# Patient Record
Sex: MALE | Race: BLACK OR AFRICAN AMERICAN | NOT HISPANIC OR LATINO | URBAN - METROPOLITAN AREA
[De-identification: names, ages, dates, MRNs, and addresses within clinical notes are randomized per-mention and may not be internally consistent; named-entity substitution may affect disease eponyms.]

---

## 2018-07-15 ENCOUNTER — INPATIENT (INPATIENT)
Facility: HOSPITAL | Age: 63
LOS: 0 days | Discharge: HOME | End: 2018-07-16
Attending: SURGERY | Admitting: SURGERY
Payer: COMMERCIAL

## 2018-07-15 VITALS
TEMPERATURE: 99 F | SYSTOLIC BLOOD PRESSURE: 174 MMHG | RESPIRATION RATE: 20 BRPM | DIASTOLIC BLOOD PRESSURE: 94 MMHG | OXYGEN SATURATION: 97 % | HEART RATE: 74 BPM

## 2018-07-15 LAB
ALBUMIN SERPL ELPH-MCNC: 4.9 G/DL — SIGNIFICANT CHANGE UP (ref 3.5–5.2)
ALP SERPL-CCNC: 108 U/L — SIGNIFICANT CHANGE UP (ref 30–115)
ALT FLD-CCNC: 14 U/L — SIGNIFICANT CHANGE UP (ref 0–41)
ANION GAP SERPL CALC-SCNC: 14 MMOL/L — SIGNIFICANT CHANGE UP (ref 7–14)
AST SERPL-CCNC: 15 U/L — SIGNIFICANT CHANGE UP (ref 0–41)
BASOPHILS # BLD AUTO: 0.02 K/UL — SIGNIFICANT CHANGE UP (ref 0–0.2)
BASOPHILS NFR BLD AUTO: 0.2 % — SIGNIFICANT CHANGE UP (ref 0–1)
BILIRUB DIRECT SERPL-MCNC: 0.2 MG/DL — SIGNIFICANT CHANGE UP (ref 0–0.2)
BILIRUB INDIRECT FLD-MCNC: 0.5 MG/DL — SIGNIFICANT CHANGE UP (ref 0.2–1.2)
BILIRUB SERPL-MCNC: 0.7 MG/DL — SIGNIFICANT CHANGE UP (ref 0.2–1.2)
BUN SERPL-MCNC: 9 MG/DL — LOW (ref 10–20)
CALCIUM SERPL-MCNC: 9.8 MG/DL — SIGNIFICANT CHANGE UP (ref 8.5–10.1)
CHLORIDE SERPL-SCNC: 101 MMOL/L — SIGNIFICANT CHANGE UP (ref 98–110)
CO2 SERPL-SCNC: 28 MMOL/L — SIGNIFICANT CHANGE UP (ref 17–32)
CREAT SERPL-MCNC: 1.1 MG/DL — SIGNIFICANT CHANGE UP (ref 0.7–1.5)
EOSINOPHIL # BLD AUTO: 0 K/UL — SIGNIFICANT CHANGE UP (ref 0–0.7)
EOSINOPHIL NFR BLD AUTO: 0 % — SIGNIFICANT CHANGE UP (ref 0–8)
GLUCOSE SERPL-MCNC: 99 MG/DL — SIGNIFICANT CHANGE UP (ref 70–99)
HCT VFR BLD CALC: 44 % — SIGNIFICANT CHANGE UP (ref 42–52)
HGB BLD-MCNC: 15.1 G/DL — SIGNIFICANT CHANGE UP (ref 14–18)
IMM GRANULOCYTES NFR BLD AUTO: 0.4 % — HIGH (ref 0.1–0.3)
LACTATE SERPL-SCNC: 1.5 MMOL/L — SIGNIFICANT CHANGE UP (ref 0.5–2.2)
LIDOCAIN IGE QN: 22 U/L — SIGNIFICANT CHANGE UP (ref 7–60)
LYMPHOCYTES # BLD AUTO: 1.46 K/UL — SIGNIFICANT CHANGE UP (ref 1.2–3.4)
LYMPHOCYTES # BLD AUTO: 11.1 % — LOW (ref 20.5–51.1)
MCHC RBC-ENTMCNC: 27.1 PG — SIGNIFICANT CHANGE UP (ref 27–31)
MCHC RBC-ENTMCNC: 34.3 G/DL — SIGNIFICANT CHANGE UP (ref 32–37)
MCV RBC AUTO: 78.9 FL — LOW (ref 80–94)
MONOCYTES # BLD AUTO: 0.83 K/UL — HIGH (ref 0.1–0.6)
MONOCYTES NFR BLD AUTO: 6.3 % — SIGNIFICANT CHANGE UP (ref 1.7–9.3)
NEUTROPHILS # BLD AUTO: 10.83 K/UL — HIGH (ref 1.4–6.5)
NEUTROPHILS NFR BLD AUTO: 82 % — HIGH (ref 42.2–75.2)
NRBC # BLD: 0 /100 WBCS — SIGNIFICANT CHANGE UP (ref 0–0)
PLATELET # BLD AUTO: 235 K/UL — SIGNIFICANT CHANGE UP (ref 130–400)
POTASSIUM SERPL-MCNC: 3.9 MMOL/L — SIGNIFICANT CHANGE UP (ref 3.5–5)
POTASSIUM SERPL-SCNC: 3.9 MMOL/L — SIGNIFICANT CHANGE UP (ref 3.5–5)
PROT SERPL-MCNC: 7.3 G/DL — SIGNIFICANT CHANGE UP (ref 6–8)
RBC # BLD: 5.58 M/UL — SIGNIFICANT CHANGE UP (ref 4.7–6.1)
RBC # FLD: 12.8 % — SIGNIFICANT CHANGE UP (ref 11.5–14.5)
SODIUM SERPL-SCNC: 143 MMOL/L — SIGNIFICANT CHANGE UP (ref 135–146)
WBC # BLD: 13.19 K/UL — HIGH (ref 4.8–10.8)
WBC # FLD AUTO: 13.19 K/UL — HIGH (ref 4.8–10.8)

## 2018-07-15 RX ORDER — ONDANSETRON 8 MG/1
4 TABLET, FILM COATED ORAL ONCE
Qty: 0 | Refills: 0 | Status: COMPLETED | OUTPATIENT
Start: 2018-07-15 | End: 2018-07-15

## 2018-07-15 RX ORDER — FAMOTIDINE 10 MG/ML
20 INJECTION INTRAVENOUS ONCE
Qty: 0 | Refills: 0 | Status: COMPLETED | OUTPATIENT
Start: 2018-07-15 | End: 2018-07-15

## 2018-07-15 RX ORDER — SODIUM CHLORIDE 9 MG/ML
1000 INJECTION, SOLUTION INTRAVENOUS
Qty: 0 | Refills: 0 | Status: DISCONTINUED | OUTPATIENT
Start: 2018-07-15 | End: 2018-07-16

## 2018-07-15 RX ADMIN — SODIUM CHLORIDE 100 MILLILITER(S): 9 INJECTION, SOLUTION INTRAVENOUS at 20:15

## 2018-07-15 RX ADMIN — FAMOTIDINE 20 MILLIGRAM(S): 10 INJECTION INTRAVENOUS at 20:14

## 2018-07-15 RX ADMIN — ONDANSETRON 4 MILLIGRAM(S): 8 TABLET, FILM COATED ORAL at 20:14

## 2018-07-15 NOTE — ED PROVIDER NOTE - NS ED ROS FT
General: +fevers, nausea, vomiting  Eyes:  No visual changes, eye pain  ENMT:  No hearing changes, pain,   Cardiac:  No chest pain, SOB or edema.   Respiratory:  No cough or respiratory distress.   GI:  +nausea, +vomiting, No diarrhea, +abdominal pain.  :  No dysuria, frequency or burning.  MS:  No muscle weakness, joint pain or back pain.  Neuro:  No  weakness.  No LOC.  Skin:  No skin rash.   Endocrine: No history of thyroid disease or diabetes.

## 2018-07-15 NOTE — ED PROVIDER NOTE - OBJECTIVE STATEMENT
61yo m no sig pmhx presents CC r sided abdominal cramping since approx 7am this morning, after eating burger inge last night which he does not usually eat. pt states that between 7am and 1pm, had approx 10 episodes nbnb emesis provoked by this r sided abdominal pain described as a cramping pain, which is now epigastric in location. pt reports tactile fever. no sob, chest pain, weakness, pain w urination, sick contacts. denies smoking hx.

## 2018-07-15 NOTE — ED PROCEDURE NOTE - ATTENDING CONTRIBUTION TO CARE
I was present for and supervised the key/critical aspects of the procedures performed during the care of the patient. IV access

## 2018-07-15 NOTE — ED PROVIDER NOTE - PHYSICAL EXAMINATION
CONSTITUTIONAL: Well-developed; well-nourished; in no acute distress.   SKIN: warm, dry  HEAD: Normocephalic; atraumatic.  EYES: PERRL, EOMI, no conjunctival erythema  ENT: No nasal discharge; airway clear, mucous membranes moist  NECK: Supple; non tender.  CARD: +S1, S2 no murmurs, gallops, or rubs. Regular rate and rhythm.   RESP: No wheezes, rales or rhonchi. CTABL  ABD: +ruq tenderness, no rebound, no guarding, no rigidity, +murphys  BACK: no cva tenderness   EXT: moves all extremities, ambulates wo assistance No clubbing, cyanosis or edema.   NEURO: Alert, oriented, grossly unremarkable  PSYCH: Cooperative, appropriate.

## 2018-07-15 NOTE — ED PROVIDER NOTE - MEDICAL DECISION MAKING DETAILS
62m w RUQ pain/tender. Labs & imaging reviewed. Surgery consulted for cholecystitis & abx given. Patient admitted for further care and management.

## 2018-07-15 NOTE — ED PROVIDER NOTE - PROGRESS NOTE DETAILS
bedside US shows gb stones, offical us pending, labs, meds, ekg ordered results reviewed, pt reevalauted, will consult surgery results reviewed, pt reevalauted, will consult surgery, spoke to dr prakash will come evaluate the pt case d/w Surgery and requesting abx and pre-op labs for admit to Dr Arroyo and operative intervention tonight. patient NPO

## 2018-07-16 ENCOUNTER — RESULT REVIEW (OUTPATIENT)
Age: 63
End: 2018-07-16

## 2018-07-16 ENCOUNTER — TRANSCRIPTION ENCOUNTER (OUTPATIENT)
Age: 63
End: 2018-07-16

## 2018-07-16 VITALS
TEMPERATURE: 97 F | HEART RATE: 70 BPM | DIASTOLIC BLOOD PRESSURE: 60 MMHG | OXYGEN SATURATION: 97 % | SYSTOLIC BLOOD PRESSURE: 127 MMHG | RESPIRATION RATE: 20 BRPM

## 2018-07-16 LAB
APTT BLD: 30.8 SEC — SIGNIFICANT CHANGE UP (ref 27–39.2)
BLD GP AB SCN SERPL QL: SIGNIFICANT CHANGE UP
INR BLD: 1.21 RATIO — SIGNIFICANT CHANGE UP (ref 0.65–1.3)
PROTHROM AB SERPL-ACNC: 13 SEC — HIGH (ref 9.95–12.87)
TYPE + AB SCN PNL BLD: SIGNIFICANT CHANGE UP

## 2018-07-16 PROCEDURE — 47562 LAPAROSCOPIC CHOLECYSTECTOMY: CPT

## 2018-07-16 PROCEDURE — 99222 1ST HOSP IP/OBS MODERATE 55: CPT | Mod: 57

## 2018-07-16 RX ORDER — ONDANSETRON 8 MG/1
4 TABLET, FILM COATED ORAL EVERY 6 HOURS
Qty: 0 | Refills: 0 | Status: DISCONTINUED | OUTPATIENT
Start: 2018-07-16 | End: 2018-07-16

## 2018-07-16 RX ORDER — ONDANSETRON 8 MG/1
4 TABLET, FILM COATED ORAL ONCE
Qty: 0 | Refills: 0 | Status: DISCONTINUED | OUTPATIENT
Start: 2018-07-16 | End: 2018-07-16

## 2018-07-16 RX ORDER — CIPROFLOXACIN LACTATE 400MG/40ML
400 VIAL (ML) INTRAVENOUS ONCE
Qty: 0 | Refills: 0 | Status: DISCONTINUED | OUTPATIENT
Start: 2018-07-16 | End: 2018-07-16

## 2018-07-16 RX ORDER — HEPARIN SODIUM 5000 [USP'U]/ML
5000 INJECTION INTRAVENOUS; SUBCUTANEOUS EVERY 8 HOURS
Qty: 0 | Refills: 0 | Status: DISCONTINUED | OUTPATIENT
Start: 2018-07-16 | End: 2018-07-16

## 2018-07-16 RX ORDER — OXYCODONE AND ACETAMINOPHEN 5; 325 MG/1; MG/1
1 TABLET ORAL EVERY 4 HOURS
Qty: 0 | Refills: 0 | Status: DISCONTINUED | OUTPATIENT
Start: 2018-07-16 | End: 2018-07-16

## 2018-07-16 RX ORDER — MORPHINE SULFATE 50 MG/1
4 CAPSULE, EXTENDED RELEASE ORAL
Qty: 0 | Refills: 0 | Status: DISCONTINUED | OUTPATIENT
Start: 2018-07-16 | End: 2018-07-16

## 2018-07-16 RX ORDER — SODIUM CHLORIDE 9 MG/ML
1000 INJECTION INTRAMUSCULAR; INTRAVENOUS; SUBCUTANEOUS
Qty: 0 | Refills: 0 | Status: DISCONTINUED | OUTPATIENT
Start: 2018-07-16 | End: 2018-07-16

## 2018-07-16 RX ORDER — METRONIDAZOLE 500 MG
500 TABLET ORAL ONCE
Qty: 0 | Refills: 0 | Status: COMPLETED | OUTPATIENT
Start: 2018-07-16 | End: 2018-07-16

## 2018-07-16 RX ORDER — OXYCODONE HYDROCHLORIDE 5 MG/1
1 TABLET ORAL
Qty: 5 | Refills: 0 | OUTPATIENT
Start: 2018-07-16

## 2018-07-16 RX ORDER — ONDANSETRON 8 MG/1
4 TABLET, FILM COATED ORAL EVERY 8 HOURS
Qty: 0 | Refills: 0 | Status: DISCONTINUED | OUTPATIENT
Start: 2018-07-16 | End: 2018-07-16

## 2018-07-16 RX ORDER — SODIUM CHLORIDE 9 MG/ML
1000 INJECTION, SOLUTION INTRAVENOUS
Qty: 0 | Refills: 0 | Status: DISCONTINUED | OUTPATIENT
Start: 2018-07-16 | End: 2018-07-16

## 2018-07-16 RX ORDER — CIPROFLOXACIN LACTATE 400MG/40ML
VIAL (ML) INTRAVENOUS
Qty: 0 | Refills: 0 | Status: DISCONTINUED | OUTPATIENT
Start: 2018-07-16 | End: 2018-07-16

## 2018-07-16 RX ORDER — CIPROFLOXACIN LACTATE 400MG/40ML
400 VIAL (ML) INTRAVENOUS ONCE
Qty: 0 | Refills: 0 | Status: COMPLETED | OUTPATIENT
Start: 2018-07-16 | End: 2018-07-16

## 2018-07-16 RX ORDER — ACETAMINOPHEN 500 MG
1 TABLET ORAL
Qty: 9 | Refills: 0 | OUTPATIENT
Start: 2018-07-16 | End: 2018-07-18

## 2018-07-16 RX ORDER — IBUPROFEN 200 MG
400 TABLET ORAL EVERY 6 HOURS
Qty: 0 | Refills: 0 | Status: DISCONTINUED | OUTPATIENT
Start: 2018-07-16 | End: 2018-07-16

## 2018-07-16 RX ORDER — PANTOPRAZOLE SODIUM 20 MG/1
40 TABLET, DELAYED RELEASE ORAL
Qty: 0 | Refills: 0 | Status: DISCONTINUED | OUTPATIENT
Start: 2018-07-16 | End: 2018-07-16

## 2018-07-16 RX ORDER — ACETAMINOPHEN 500 MG
650 TABLET ORAL EVERY 4 HOURS
Qty: 0 | Refills: 0 | Status: DISCONTINUED | OUTPATIENT
Start: 2018-07-16 | End: 2018-07-16

## 2018-07-16 RX ORDER — FAMOTIDINE 10 MG/ML
20 INJECTION INTRAVENOUS EVERY 12 HOURS
Qty: 0 | Refills: 0 | Status: DISCONTINUED | OUTPATIENT
Start: 2018-07-16 | End: 2018-07-16

## 2018-07-16 RX ORDER — ACETAMINOPHEN 500 MG
650 TABLET ORAL ONCE
Qty: 0 | Refills: 0 | Status: DISCONTINUED | OUTPATIENT
Start: 2018-07-16 | End: 2018-07-16

## 2018-07-16 RX ORDER — CIPROFLOXACIN LACTATE 400MG/40ML
400 VIAL (ML) INTRAVENOUS EVERY 12 HOURS
Qty: 0 | Refills: 0 | Status: DISCONTINUED | OUTPATIENT
Start: 2018-07-16 | End: 2018-07-16

## 2018-07-16 RX ORDER — IBUPROFEN 200 MG
1 TABLET ORAL
Qty: 9 | Refills: 0 | OUTPATIENT
Start: 2018-07-16 | End: 2018-07-18

## 2018-07-16 RX ORDER — METRONIDAZOLE 500 MG
500 TABLET ORAL EVERY 8 HOURS
Qty: 0 | Refills: 0 | Status: DISCONTINUED | OUTPATIENT
Start: 2018-07-16 | End: 2018-07-16

## 2018-07-16 RX ORDER — MORPHINE SULFATE 50 MG/1
2 CAPSULE, EXTENDED RELEASE ORAL
Qty: 0 | Refills: 0 | Status: DISCONTINUED | OUTPATIENT
Start: 2018-07-16 | End: 2018-07-16

## 2018-07-16 RX ADMIN — Medication 650 MILLIGRAM(S): at 11:32

## 2018-07-16 RX ADMIN — HEPARIN SODIUM 5000 UNIT(S): 5000 INJECTION INTRAVENOUS; SUBCUTANEOUS at 13:14

## 2018-07-16 RX ADMIN — Medication 200 MILLIGRAM(S): at 01:35

## 2018-07-16 RX ADMIN — Medication 650 MILLIGRAM(S): at 13:14

## 2018-07-16 RX ADMIN — Medication 100 MILLIGRAM(S): at 01:35

## 2018-07-16 RX ADMIN — Medication 400 MILLIGRAM(S): at 11:32

## 2018-07-16 RX ADMIN — SODIUM CHLORIDE 100 MILLILITER(S): 9 INJECTION, SOLUTION INTRAVENOUS at 07:45

## 2018-07-16 NOTE — DISCHARGE NOTE ADULT - HOSPITAL COURSE
62 year old male presented to ED with abdominal pain, imaging showed cholecystitis. The patient was taken to the OR for a laparoscopic cholecystectomy. The case was reported as difficult, only removing part of the gallbladder, but was not converted to open. The patient tolerated the procedure fine. Post operatively, the patient tolerated diet, his pain is well controlled and he ambulated. The patient will be discharged to home with instructions to follow up with Dr. Arroyo in two weeks. He will take ibuprofen and tylenol around the clock for three days and oxycodone as needed for breakthrough pain.

## 2018-07-16 NOTE — DISCHARGE NOTE ADULT - CARE PROVIDER_API CALL
Jose Arroyo), [facility]  [address] [place] [address]  [address] [place] [address]  Phone: [phone]  Fax: [phone]

## 2018-07-16 NOTE — DISCHARGE NOTE ADULT - MEDICATION SUMMARY - MEDICATIONS TO TAKE
I will START or STAY ON the medications listed below when I get home from the hospital:    ibuprofen 600 mg oral tablet  -- 1 tab(s) by mouth every 8 hours   -- Do not take this drug if you are pregnant.  It is very important that you take or use this exactly as directed.  Do not skip doses or discontinue unless directed by your doctor.  May cause drowsiness or dizziness.  Obtain medical advice before taking any non-prescription drugs as some may affect the action of this medication.  Take with food or milk.    -- Indication: For CHOLECYSTITIS    oxyCODONE 5 mg oral tablet  -- 1 tab(s) by mouth every 6 hours MDD:4  -- Caution federal law prohibits the transfer of this drug to any person other  than the person for whom it was prescribed.  It is very important that you take or use this exactly as directed.  Do not skip doses or discontinue unless directed by your doctor.  May cause drowsiness.  Alcohol may intensify this effect.  Use care when operating dangerous machinery.  This prescription cannot be refilled.  Using more of this medication than prescribed may cause serious breathing problems.    -- Indication: For CHOLECYSTITIS    acetaminophen 500 mg oral tablet  -- 1 tab(s) by mouth every 8 hours   -- This product contains acetaminophen.  Do not use  with any other product containing acetaminophen to prevent possible liver damage.    -- Indication: For CHOLECYSTITIS

## 2018-07-16 NOTE — H&P ADULT - HISTORY OF PRESENT ILLNESS
61yo m no sig pmhx presents CC r sided abdominal cramping since approx 7am this morning, after eating burger inge last night which he does not usually eat. pt states that between 7am and 1pm, had approx 10 episodes nbnb emesis provoked by this r sided abdominal pain described as a cramping pain, which is now epigastric in location. pt reports tactile fever. no sob, chest pain, weakness, pain w urination, sick contacts. Denies being ever diagnosed with gallbladder stones.

## 2018-07-16 NOTE — DISCHARGE NOTE ADULT - CARE PLAN
Principal Discharge DX:	Cholecystitis  Goal:	Complete Recovery  Assessment and plan of treatment:	Take tylenol and motrin every 8 hours around the clock for 3 days for pain. Take Roxicodone as needed for breakthrough pain.  Can remove the outer dressings in 2-3 days, ok to shower regularly. Keep steri-strips in place, they will fall off on their own in the shower.  Avoid heavy lifting (anything over 10 pounds) for at least 6 weeks.   Follow up with Dr. Arroyo in his outpatient clinic in 2 weeks. Call to schedule an appointment [phone].  Please return to the emergency room if temp >100.4, unable to tolerate diet, or worsening abdominal pain.

## 2018-07-16 NOTE — H&P ADULT - ASSESSMENT
61yo m no sig pmhx presents CC r sided abdominal cramping since approx 7am this morning, after eating burger inge last night which he does not usually eat. pt states that between 7am and 1pm, had approx 10 episodes nbnb emesis provoked by this r sided abdominal pain described as a cramping pain, which is now epigastric in location. pt reports tactile fever. no sob, chest pain, weakness, pain w urination, sick contacts. Denies being ever diagnosed with gallbladder stones. Found to have evidence of acute cholecystitis on US abdomen:  Plan:  -admit to surgery under DR. Arroyo  -npo, ivf  -pre op for OR for laparoscopic cholecystectomy, possible open

## 2018-07-16 NOTE — DISCHARGE NOTE ADULT - PLAN OF CARE
Complete Recovery Take tylenol and motrin every 8 hours around the clock for 3 days for pain. Take Roxicodone as needed for breakthrough pain.  Can remove the outer dressings in 2-3 days, ok to shower regularly. Keep steri-strips in place, they will fall off on their own in the shower.  Avoid heavy lifting (anything over 10 pounds) for at least 6 weeks.   Follow up with Dr. Arroyo in his outpatient clinic in 2 weeks. Call to schedule an appointment [phone].  Please return to the emergency room if temp >100.4, unable to tolerate diet, or worsening abdominal pain.

## 2018-07-16 NOTE — DISCHARGE NOTE ADULT - INSTRUCTIONS
Continue regular diet, avoid fatty foods.  Avoid heavy lifting (nothing over 10 pounds) for at least 6 weeks.

## 2018-07-16 NOTE — PRE-ANESTHESIA EVALUATION ADULT - NSANTHOSAYNRD_GEN_A_CORE
No. NAHUN screening performed.  STOP BANG Legend: 0-2 = LOW Risk; 3-4 = INTERMEDIATE Risk; 5-8 = HIGH Risk

## 2018-07-16 NOTE — H&P ADULT - NSHPPHYSICALEXAM_GEN_ALL_CORE
Vital Signs Last 24 Hrs  T(C): 37.3 (15 Jul 2018 18:44), Max: 37.3 (15 Jul 2018 18:44)  T(F): 99.2 (15 Jul 2018 18:44), Max: 99.2 (15 Jul 2018 18:44)  HR: 74 (15 Jul 2018 18:44) (74 - 74)  BP: 174/94 (15 Jul 2018 18:44) (174/94 - 174/94)  RR: 20 (15 Jul 2018 18:44) (20 - 20)  SpO2: 97% (15 Jul 2018 18:44) (97% - 97%)  CONSTITUTIONAL: Well-developed; well-nourished; in no acute distress.   SKIN: warm, dry  HEAD: Normocephalic; atraumatic.  EYES: PERRL, EOMI, no conjunctival erythema  ENT: No nasal discharge; airway clear, mucous membranes moist  NECK: Supple; non tender.  CARD: +S1, S2 no murmurs, gallops, or rubs. Regular rate and rhythm.   RESP: No wheezes, rales or rhonchi. CTABL  ABD: obese, +ruq/epigastric tenderness, no rebound, no guarding, no rigidity, +murphys  BACK: no cva tenderness   EXT: moves all extremities, ambulates wo assistance No clubbing, cyanosis or edema.   NEURO: Alert, oriented, grossly unremarkable  PSYCH: Cooperative, appropriate.

## 2018-07-16 NOTE — H&P ADULT - NSHPLABSRESULTS_GEN_ALL_CORE
Lipase, Serum (07.15.18 @ 19:36)    Lipase, Serum: 22 U/L  Hepatic Function Panel (07.15.18 @ 19:36)    Indirect Reacting Bilirubin: 0.5 mg/dL    Protein Total, Serum: 7.3 g/dL    Albumin, Serum: 4.9 g/dL    Bilirubin Total, Serum: 0.7 mg/dL    Bilirubin Direct, Serum: 0.2 mg/dL    Alkaline Phosphatase, Serum: 108 U/L    Aspartate Aminotransferase (AST/SGOT): 15 U/L    Alanine Aminotransferase (ALT/SGPT): 14 U/L  Complete Blood Count + Automated Diff (07.15.18 @ 19:36)    WBC Count: 13.19 K/uL    RBC Count: 5.58 M/uL    Hemoglobin: 15.1 g/dL    Hematocrit: 44.0 %    Mean Cell Volume: 78.9 fL    Mean Cell Hemoglobin: 27.1 pg    Mean Cell Hemoglobin Conc: 34.3 g/dL    Red Cell Distrib Width: 12.8 %    Platelet Count - Automated: 235 K/uL    Auto Neutrophil #: 10.83 K/uL    Auto Lymphocyte #: 1.46 K/uL    Auto Monocyte #: 0.83 K/uL    Auto Eosinophil #: 0.00 K/uL    Auto Basophil #: 0.02 K/uL    Auto Neutrophil %: 82.0: Differential percentages must be correlated with absolute numbers for  clinical significance. %    Auto Lymphocyte %: 11.1 %    Auto Monocyte %: 6.3 %    Auto Eosinophil %: 0.0 %    Auto Basophil %: 0.2 %    Auto Immature Granulocyte %: 0.4 %  Basic Metabolic Panel (07.15.18 @ 19:36)    Sodium, Serum: 143 mmol/L    Potassium, Serum: 3.9 mmol/L    Chloride, Serum: 101 mmol/L    Carbon Dioxide, Serum: 28 mmol/L    Anion Gap, Serum: 14 mmol/L    Blood Urea Nitrogen, Serum: 9 mg/dL    Creatinine, Serum: 1.1 mg/dL    Glucose, Serum: 99 mg/dL    Calcium, Total Serum: 9.8 mg/dL  Lactate, Blood (07.15.18 @ 19:35)    Lactate, Blood: 1.5 mmol/L  Imaging:  < from: US Abdomen Limited (07.15.18 @ 23:02) >    IMPRESSION:    Cholelithiasis with mild thickening of the gallbladder wall. Findings are   equivocal for acute cholecystitis in the absence of significant   pericholecystic fluid and negative Oropeza's sign. Correlate with   symptoms. If clinically warranted, nuclear medicine HIDA scan may be of   benefit..     < end of copied text >

## 2018-07-16 NOTE — BRIEF OPERATIVE NOTE - PROCEDURE
<<-----Click on this checkbox to enter Procedure Partial cholecystectomy  07/16/2018    Active  FROILAN

## 2018-07-16 NOTE — DISCHARGE NOTE ADULT - PATIENT PORTAL LINK FT
You can access the [facility] Portal, offered by [facility], by registering with the following website: [email or web address]

## 2018-07-16 NOTE — CHART NOTE - NSCHARTNOTEFT_GEN_A_CORE
S/P Laparoscopic Cholecystectomy under General Endotracheal anesthesia.  Patient arousable, patent airway with intact reflexes, no n/v, pain controlled per current regimen, adequate hydration.  /68 P 79 R 16 T 98.7 SpO2 99%. Discharge to floor when criteria are met

## 2018-07-19 LAB — SURGICAL PATHOLOGY STUDY: SIGNIFICANT CHANGE UP

## 2018-07-23 PROBLEM — Z00.00 ENCOUNTER FOR PREVENTIVE HEALTH EXAMINATION: Status: ACTIVE | Noted: 2018-07-23

## 2018-07-30 ENCOUNTER — APPOINTMENT (OUTPATIENT)
Dept: SURGERY | Facility: CLINIC | Age: 63
End: 2018-07-30
Payer: COMMERCIAL

## 2018-07-30 VITALS
SYSTOLIC BLOOD PRESSURE: 140 MMHG | DIASTOLIC BLOOD PRESSURE: 78 MMHG | HEIGHT: 71 IN | WEIGHT: 220 LBS | BODY MASS INDEX: 30.8 KG/M2

## 2018-07-30 DIAGNOSIS — K81.0 ACUTE CHOLECYSTITIS: ICD-10-CM

## 2018-07-30 PROCEDURE — 99024 POSTOP FOLLOW-UP VISIT: CPT

## 2018-07-31 DIAGNOSIS — K80.00 CALCULUS OF GALLBLADDER WITH ACUTE CHOLECYSTITIS WITHOUT OBSTRUCTION: ICD-10-CM

## 2018-07-31 DIAGNOSIS — R10.9 UNSPECIFIED ABDOMINAL PAIN: ICD-10-CM

## 2018-07-31 PROBLEM — K81.0 ACUTE CHOLECYSTITIS: Status: ACTIVE | Noted: 2018-07-31

## 2022-10-05 NOTE — ED PROVIDER NOTE - DISCUSSED CLINICAL AND RADIOLOGICAL FINDINGS WITH, MDM
Patient LM stating he has an appointment 10/14/22 with Dr. Boucher for a closed nondisplaced fracture of proximal phalanx of left great toe.  He was seen in Walk In 10/3/22 with injury date around 9/28/22.  He is concerned that he has developed what looks to be a blister on that toe and seems to be filling with blood.  He wonders if this is a concern.    I called patient back and told him blisters can happen as a results of swelling.  I asked if he is elevating and he said he is trying the best he can but is back to work, so he's not elevating as much as he should be.  He does not have a bandage or dressing on the toe but is using the pneumatic boot he was given.  I advised him to leave the blister alone.  I recommend covering the blister with gauze dressing or bandage in case it were to pop.  If that happens, he should keep the area clean and covered.  I also urged him to rest/ice/elavate foot more frequently to reduce swelling and pressure on the blister.  I will update Dr. Boucher and call patient if he has any additional recommendations.  We will otherwise see him at his scheduled appointment.   patient

## 2023-09-26 NOTE — DISCHARGE NOTE ADULT - NS MD DC FALL RISK RISK
pulse oximetry
pulse oximetry
For information on Fall & Injury Prevention, visit [email or web address]

## 2025-05-21 NOTE — ED PROCEDURE NOTE - CPROC ED COMPLICATIONS1
Continued Stay SW/CM Assessment/Plan of Care Note       Active Substitute Decision Maker (SDM)       MERRY RINCON [facility] Agent 1 - Mother   Primary Phone: [phone] (Mobile)                     Progress note:  CIWA score 0, updates sent to [facility] pending acceptance. Patient will need insurance auth for SNF.     Patient walked total of 100 ft with stand by assist today with therapy. Patient is not meeting criteria for SNF, patient should be able to return home with [facility], MD made aware. [facility] orders pending.     See SW/CM flowsheets for other objective data.    Disposition Recommendations:  Preliminary discharge destination: Planned Discharge Destination: Home with services/support  SW/CM recommendation for discharge: Home therapy, Home care    Destination Pharmacy:        Discharge Plan/Needs:     Continued Care and Services - Admitted Since 5/15/2025    No active coordination exists for this encounter.           Devices/ Equipment that need to be arranged for discharge: None at this time       Prior To Hospitalization:    Living Situation: Alone and residing at Senior apartment    .  Support Systems: Parent   Home Devices/Equipment: None            Mobility Assist Devices: (S) Standard walker   Type of Service Prior to Hospitalization: None               Patient/Family discharge goal (s):  Home Care, Home therapy     Resources provided:           Prior Function  Bed Mobility: Modified Independent (05/16/25 1000 : Zeeshan Lazar, PT)  Transfers: Modified Independent (05/16/25 1000 : Zeeshan Lazar, PT)  Ambulation in the Home: Modified  Independent (05/16/25 1000 : Zeeshan Lazar, PT)  Ambulation in the Community: Total Assist (Total) (05/16/25 1000 : Zeeshan Lazar, PT)    Current Function  Last Filed Values         Value Time User    Current Function  significantly below baseline level of function 5/21/2025  1:59 PM Sharona High, PT    Therapy Impairments  activity tolerance; balance; strength 5/21/2025   1:59 PM Sharona High, PT    ADLs Requiring Support  bed mobility; transfers; ambulation 5/21/2025  1:59 PM Sharona High, PT            Therapy Recommendations for Discharge:   PT:      Last Filed Values         Value Time User    PT Discharge Needs  therapy 5 or more times per week 5/21/2025  1:59 PM Sharona High, PT          OT:       Last Filed Values       None          SLP:    Last Filed Values       None            Mobility Equipment Recommended for Discharge: owns 2WW      Barriers to Discharge  Identified Barriers to Discharge/Transition Planning: Medical necessity for acute care, Consult Pending/Clearance                   no

## 2025-06-21 NOTE — ED ADULT NURSE NOTE - FALL HARM RISK TYPE OF ASSESSMENT
Patient VS stable, resting comfortably and breathing unlabored in cart. Discharge education and paperwork provided to patient's guardian. Patient's guardian provided with indications to return to PED, verbalized understanding of instruction, and denies any further questions or concerns at this time. Instructed to follow-up with PMD. Tylenol/Motrin dosing chart and guidelines provided prior to discharge. Patient appropriate for discharge at this time, per ERMD.  
Admission